# Patient Record
Sex: MALE | Race: WHITE | NOT HISPANIC OR LATINO | Employment: FULL TIME | URBAN - METROPOLITAN AREA
[De-identification: names, ages, dates, MRNs, and addresses within clinical notes are randomized per-mention and may not be internally consistent; named-entity substitution may affect disease eponyms.]

---

## 2021-11-02 ENCOUNTER — TELEPHONE (OUTPATIENT)
Dept: UROLOGY | Facility: CLINIC | Age: 21
End: 2021-11-02

## 2021-11-02 NOTE — TELEPHONE ENCOUNTER
Provider: VICKIE ASHLEY  Caller:PT DAUGHTER  Relationship to Patient: DAUGHTER  Phone Number: [phone]  Reason for Call: IS UPCOMING APPT FOR ACTUAL PROCEDURE OR CONSULT? PT NEEDS TO LET HIS WORK KNOW HOW LONG HE WILL NEED OFF BY END OF DAY    NO BH VERBAL ON FILE TO SPEAK TO ANYONE ELSE. PLEASE CONTACT PT TO LET HIM KNOW WHAT TO EXPECT AT APPT.

## 2021-11-19 ENCOUNTER — OFFICE VISIT (OUTPATIENT)
Dept: UROLOGY | Facility: CLINIC | Age: 21
End: 2021-11-19

## 2021-11-19 VITALS
WEIGHT: 215 LBS | HEART RATE: 87 BPM | SYSTOLIC BLOOD PRESSURE: 149 MMHG | BODY MASS INDEX: 33.74 KG/M2 | TEMPERATURE: 98.4 F | HEIGHT: 67 IN | DIASTOLIC BLOOD PRESSURE: 83 MMHG

## 2021-11-19 DIAGNOSIS — Z30.2 STERILIZATION: Primary | ICD-10-CM

## 2021-11-19 LAB
BILIRUB BLD-MCNC: NEGATIVE MG/DL
CLARITY, POC: CLEAR
COLOR UR: YELLOW
EXPIRATION DATE: NORMAL
GLUCOSE UR STRIP-MCNC: NEGATIVE MG/DL
KETONES UR QL: NEGATIVE
LEUKOCYTE EST, POC: NEGATIVE
Lab: NORMAL
NITRITE UR-MCNC: NEGATIVE MG/ML
PH UR: 6.5 [PH] (ref 5–8)
PROT UR STRIP-MCNC: NEGATIVE MG/DL
RBC # UR STRIP: NEGATIVE /UL
SP GR UR: 1.02 (ref 1–1.03)
UROBILINOGEN UR QL: NORMAL

## 2021-11-19 PROCEDURE — 99202 OFFICE O/P NEW SF 15 MIN: CPT | Performed by: NURSE PRACTITIONER

## 2021-11-19 NOTE — PROGRESS NOTES
"Chief Complaint  Sterilization    Subjective          Omkar Nelson 21 y.o.male presents to [facility] UROLOGY  History of Present Illness  The patient is a consultation from LEONARDO Vela, for vasectomy counseling. Prior  surgeries have not been performed. Patient is  and has 2 biological children. He and his spouse want vasectomy.    The patient is counseled regarding a no scalpel vasectomy. Key points are that it should be considered irreversible even though it can be reversed, there are risks including failure to achieve sterility, recanalization, bleeding, testicular swelling and/or pain, formation of a sperm granuloma and infection. Limitations of activity post-procedure were discussed and he understands that he is not sterile immediately following the procedure. He will need to bring a semen specimen back in about 6-8 weeks to confirm the abscence of sperm and needs to use contraception until then. Patient understands this is considered an irreversible procedure and wishes have performed. Denies any urological problems or bleeding disorders.     LEONARDO Baltazar  11/19/2021       Review of Systems   Constitutional: Negative for chills and fever.   Respiratory: Negative.    Genitourinary: Negative.    Allergic/Immunologic: Negative.    Psychiatric/Behavioral: Negative.       Objective   Vital Signs:   /83   Pulse 87   Temp 98.4 °F (36.9 °C)   Ht 170.2 cm (67\")   Wt 97.5 kg (215 lb)   BMI 33.67 kg/m²     Physical Exam  Vitals and nursing note reviewed.   Constitutional:       General: He is not in acute distress.     Appearance: Normal appearance. He is well-developed. He is not ill-appearing.   Cardiovascular:      Rate and Rhythm: Normal rate and regular rhythm.   Pulmonary:      Effort: Pulmonary effort is normal.      Breath sounds: Normal breath sounds.   Genitourinary:     Testes: Normal.      Epididymis:      Right: Normal.      Left: Normal.   Skin:     " General: Skin is warm and dry.   Neurological:      Mental Status: He is alert and oriented to person, place, and time.   Psychiatric:         Mood and Affect: Mood normal.         Behavior: Behavior normal. Behavior is cooperative.         Thought Content: Thought content normal.         Judgment: Judgment normal.        Result Review :        POC Urinalysis Dipstick, Automated (11/19/2021 10:48)              Assessment and Plan    Diagnoses and all orders for this visit:    1. Sterilization (Primary)  -     POC Urinalysis Dipstick, Automated  -     Vasectomy; Future        Follow Up   Return in about 1 month (around 12/19/2021) for vasectomy 30 day wait period.  Patient was given instructions and counseling regarding his condition or for health maintenance advice. Please see specific information pulled into the AVS if appropriate. Instructions    • The patient is to go immediately home and lie down flat on his back with an ice pack on the scrotum. He may shower in the morning but no immersion in water for 4 days. He is to avoid strenuous activity for 3 days and straddle activity for 3 weeks. He is reminded that he is not yet sterile and must use contraception until we confirm he no longer has sperm in a semen specimen to be brought to the office in 6 weeks. He is to call for problems.  • The patient will schedule a vasectomy if he desires  to proceed.  • Handouts were provided.  • Electronically Identified Patient Education Materials provided.    All risks, benefits and alternatives to vasectomy discussed and understood. Understanding that this is considered an irreversible procedure. Written consent was obtained. Verbal and written instructions and information were provided. Schedule procedure when patient desires.    LEONARDO Baltazar

## 2021-12-06 ENCOUNTER — HOSPITAL ENCOUNTER (EMERGENCY)
Facility: HOSPITAL | Age: 21
Discharge: ANOTHER HOSPITAL | End: 2021-12-06
Attending: EMERGENCY MEDICINE | Admitting: EMERGENCY MEDICINE

## 2021-12-06 ENCOUNTER — APPOINTMENT (OUTPATIENT)
Dept: CT IMAGING | Facility: HOSPITAL | Age: 21
End: 2021-12-06

## 2021-12-06 VITALS
HEART RATE: 82 BPM | OXYGEN SATURATION: 99 % | HEIGHT: 67 IN | DIASTOLIC BLOOD PRESSURE: 95 MMHG | SYSTOLIC BLOOD PRESSURE: 147 MMHG | TEMPERATURE: 98.1 F | WEIGHT: 224.1 LBS | RESPIRATION RATE: 18 BRPM | BODY MASS INDEX: 35.17 KG/M2
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
## 2021-12-06 DIAGNOSIS — T14.8XXA VASCULAR INJURY: ICD-10-CM

## 2021-12-06 LAB
ALBUMIN SERPL-MCNC: 4.5 G/DL (ref 3.5–5.2)
ALBUMIN/GLOB SERPL: 2 G/DL
ALP SERPL-CCNC: 100 U/L (ref 39–117)
ALT SERPL W P-5'-P-CCNC: 470 U/L (ref 1–41)
ANION GAP SERPL CALCULATED.3IONS-SCNC: 12.9 MMOL/L (ref 5–15)
AST SERPL-CCNC: 378 U/L (ref 1–40)
BASOPHILS # BLD AUTO: 0.07 10*3/MM3 (ref 0–0.2)
BASOPHILS NFR BLD AUTO: 0.6 % (ref 0–1.5)
BILIRUB SERPL-MCNC: 0.4 MG/DL (ref 0–1.2)
BUN SERPL-MCNC: 16 MG/DL (ref 6–20)
BUN/CREAT SERPL: 12.9 (ref 7–25)
CALCIUM SPEC-SCNC: 9.1 MG/DL (ref 8.6–10.5)
CHLORIDE SERPL-SCNC: 102 MMOL/L (ref 98–107)
CO2 SERPL-SCNC: 21.1 MMOL/L (ref 22–29)
CREAT SERPL-MCNC: 1.24 MG/DL (ref 0.76–1.27)
DEPRECATED RDW RBC AUTO: 44.1 FL (ref 37–54)
EOSINOPHIL # BLD AUTO: 0.19 10*3/MM3 (ref 0–0.4)
EOSINOPHIL NFR BLD AUTO: 1.6 % (ref 0.3–6.2)
ERYTHROCYTE [DISTWIDTH] IN BLOOD BY AUTOMATED COUNT: 13.7 % (ref 12.3–15.4)
GFR SERPL CREATININE-BSD FRML MDRD: 72 ML/MIN/1.73
GLOBULIN UR ELPH-MCNC: 2.3 GM/DL
GLUCOSE SERPL-MCNC: 174 MG/DL (ref 65–99)
HCT VFR BLD AUTO: 47.7 % (ref 37.5–51)
HGB BLD-MCNC: 16.6 G/DL (ref 13–17.7)
IMM GRANULOCYTES # BLD AUTO: 0.19 10*3/MM3 (ref 0–0.05)
IMM GRANULOCYTES NFR BLD AUTO: 1.6 % (ref 0–0.5)
LYMPHOCYTES # BLD AUTO: 2.18 10*3/MM3 (ref 0.7–3.1)
LYMPHOCYTES NFR BLD AUTO: 18.5 % (ref 19.6–45.3)
MCH RBC QN AUTO: 30.8 PG (ref 26.6–33)
MCHC RBC AUTO-ENTMCNC: 34.8 G/DL (ref 31.5–35.7)
MCV RBC AUTO: 88.5 FL (ref 79–97)
MONOCYTES # BLD AUTO: 0.69 10*3/MM3 (ref 0.1–0.9)
MONOCYTES NFR BLD AUTO: 5.8 % (ref 5–12)
NEUTROPHILS NFR BLD AUTO: 71.9 % (ref 42.7–76)
NEUTROPHILS NFR BLD AUTO: 8.48 10*3/MM3 (ref 1.7–7)
NRBC BLD AUTO-RTO: 0 /100 WBC (ref 0–0.2)
PLATELET # BLD AUTO: 298 10*3/MM3 (ref 140–450)
PMV BLD AUTO: 11.2 FL (ref 6–12)
POTASSIUM SERPL-SCNC: 3.7 MMOL/L (ref 3.5–5.2)
PROT SERPL-MCNC: 6.8 G/DL (ref 6–8.5)
RBC # BLD AUTO: 5.39 10*6/MM3 (ref 4.14–5.8)
SODIUM SERPL-SCNC: 136 MMOL/L (ref 136–145)
WBC NRBC COR # BLD: 11.8 10*3/MM3 (ref 3.4–10.8)

## 2021-12-06 PROCEDURE — 74177 CT ABD & PELVIS W/CONTRAST: CPT

## 2021-12-06 PROCEDURE — 80053 COMPREHEN METABOLIC PANEL: CPT | Performed by: EMERGENCY MEDICINE

## 2021-12-06 PROCEDURE — 71260 CT THORAX DX C+: CPT

## 2021-12-06 PROCEDURE — 85025 COMPLETE CBC W/AUTO DIFF WBC: CPT | Performed by: EMERGENCY MEDICINE

## 2021-12-06 PROCEDURE — 72125 CT NECK SPINE W/O DYE: CPT

## 2021-12-06 PROCEDURE — 0 IOPAMIDOL PER 1 ML: Performed by: EMERGENCY MEDICINE

## 2021-12-06 PROCEDURE — 70450 CT HEAD/BRAIN W/O DYE: CPT

## 2021-12-06 PROCEDURE — 99283 EMERGENCY DEPT VISIT LOW MDM: CPT

## 2021-12-06 PROCEDURE — 36415 COLL VENOUS BLD VENIPUNCTURE: CPT

## 2021-12-06 RX ADMIN — IOPAMIDOL 100 ML: 755 INJECTION, SOLUTION INTRAVENOUS at 08:52
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
## 2022-01-14 ENCOUNTER — PROCEDURE VISIT (OUTPATIENT)
Dept: UROLOGY | Facility: CLINIC | Age: 22
End: 2022-01-14

## 2022-01-14 DIAGNOSIS — Z30.2 STERILIZATION: Primary | ICD-10-CM

## 2022-01-14 PROCEDURE — 55250 REMOVAL OF SPERM DUCT(S): CPT | Performed by: UROLOGY

## 2022-04-06 ENCOUNTER — LAB (OUTPATIENT)
Dept: UROLOGY | Facility: CLINIC | Age: 22
End: 2022-04-06

## 2022-04-06 DIAGNOSIS — Z30.2 STERILIZATION: Primary | ICD-10-CM

## 2022-04-11 ENCOUNTER — LAB (OUTPATIENT)
Dept: UROLOGY | Facility: CLINIC | Age: 22
End: 2022-04-11

## 2022-04-11 DIAGNOSIS — Z30.2 STERILIZATION: Primary | ICD-10-CM
